# Patient Record
Sex: FEMALE | URBAN - METROPOLITAN AREA
[De-identification: names, ages, dates, MRNs, and addresses within clinical notes are randomized per-mention and may not be internally consistent; named-entity substitution may affect disease eponyms.]

---

## 2019-03-23 ENCOUNTER — RECORDS - HEALTHEAST (OUTPATIENT)
Dept: LAB | Facility: CLINIC | Age: 29
End: 2019-03-23

## 2019-03-24 LAB — BACTERIA SPEC CULT: NO GROWTH

## 2019-03-28 ENCOUNTER — RECORDS - HEALTHEAST (OUTPATIENT)
Dept: LAB | Facility: CLINIC | Age: 29
End: 2019-03-28

## 2019-03-29 LAB
C TRACH DNA SPEC QL PROBE+SIG AMP: NEGATIVE
N GONORRHOEA DNA SPEC QL NAA+PROBE: NEGATIVE

## 2019-04-02 LAB
BKR LAB AP ABNORMAL BLEEDING: NO
BKR LAB AP BIRTH CONTROL/HORMONES: ABNORMAL
BKR LAB AP CERVICAL APPEARANCE: ABNORMAL
BKR LAB AP GYN ADEQUACY: ABNORMAL
BKR LAB AP GYN INTERPRETATION: ABNORMAL
BKR LAB AP HPV REFLEX: ABNORMAL
BKR LAB AP LMP: ABNORMAL
BKR LAB AP PATIENT STATUS: NO
BKR LAB AP PREVIOUS ABNORMAL: NO
BKR LAB AP PREVIOUS NORMAL: ABNORMAL
HIGH RISK?: NO
PATH REPORT.COMMENTS IMP SPEC: ABNORMAL
RESULT FLAG (HE HISTORICAL CONVERSION): ABNORMAL

## 2019-04-03 LAB
HPV SOURCE: ABNORMAL
HUMAN PAPILLOMA VIRUS 16 DNA: NEGATIVE
HUMAN PAPILLOMA VIRUS 18 DNA: NEGATIVE
HUMAN PAPILLOMA VIRUS FINAL DIAGNOSIS: ABNORMAL
HUMAN PAPILLOMA VIRUS OTHER HR: POSITIVE
SPECIMEN DESCRIPTION: ABNORMAL

## 2019-04-25 ENCOUNTER — RECORDS - HEALTHEAST (OUTPATIENT)
Dept: ADMINISTRATIVE | Facility: OTHER | Age: 29
End: 2019-04-25

## 2019-04-25 LAB
LAB AP CHARGES (HE HISTORICAL CONVERSION): NORMAL
PATH REPORT.COMMENTS IMP SPEC: NORMAL
PATH REPORT.COMMENTS IMP SPEC: NORMAL
PATH REPORT.FINAL DX SPEC: NORMAL
PATH REPORT.GROSS SPEC: NORMAL
PATH REPORT.MICROSCOPIC SPEC OTHER STN: NORMAL
PATH REPORT.RELEVANT HX SPEC: NORMAL
RESULT FLAG (HE HISTORICAL CONVERSION): NORMAL

## 2019-08-02 ASSESSMENT — MIFFLIN-ST. JEOR: SCORE: 1449.36

## 2019-08-07 ENCOUNTER — ANESTHESIA - HEALTHEAST (OUTPATIENT)
Dept: SURGERY | Facility: AMBULATORY SURGERY CENTER | Age: 29
End: 2019-08-07

## 2019-08-08 ENCOUNTER — SURGERY - HEALTHEAST (OUTPATIENT)
Dept: SURGERY | Facility: AMBULATORY SURGERY CENTER | Age: 29
End: 2019-08-08

## 2019-08-08 ASSESSMENT — MIFFLIN-ST. JEOR: SCORE: 1449.36

## 2021-05-27 ENCOUNTER — RECORDS - HEALTHEAST (OUTPATIENT)
Dept: LAB | Facility: CLINIC | Age: 31
End: 2021-05-27

## 2021-05-27 LAB
ANION GAP SERPL CALCULATED.3IONS-SCNC: 11 MMOL/L (ref 5–18)
BUN SERPL-MCNC: 9 MG/DL (ref 8–22)
CALCIUM SERPL-MCNC: 9.2 MG/DL (ref 8.5–10.5)
CHLORIDE BLD-SCNC: 103 MMOL/L (ref 98–107)
CHOLEST SERPL-MCNC: 185 MG/DL
CO2 SERPL-SCNC: 25 MMOL/L (ref 22–31)
CREAT SERPL-MCNC: 0.73 MG/DL (ref 0.6–1.1)
FASTING STATUS PATIENT QL REPORTED: ABNORMAL
GFR SERPL CREATININE-BSD FRML MDRD: >60 ML/MIN/1.73M2
GLUCOSE BLD-MCNC: 92 MG/DL (ref 70–125)
HDLC SERPL-MCNC: 36 MG/DL
LDLC SERPL CALC-MCNC: 81 MG/DL
POTASSIUM BLD-SCNC: 4.1 MMOL/L (ref 3.5–5)
SODIUM SERPL-SCNC: 139 MMOL/L (ref 136–145)
TRIGL SERPL-MCNC: 340 MG/DL

## 2021-05-31 NOTE — ANESTHESIA CARE TRANSFER NOTE
Last vitals:   Vitals:    08/08/19 1103   BP: 127/73   Pulse: (!) 103   Resp: 16   Temp: 36.2  C (97.1  F)   SpO2: 100%     Pt brought to PACU on 10L facemask. Monitors applied. VSS upon arrival.    Patient's level of consciousness is drowsy  Spontaneous respirations: yes  Maintains airway independently: yes  Dentition unchanged: yes  Oropharynx: oropharynx clear of all foreign objects    QCDR Measures:  ASA# 20 - Surgical Safety Checklist: WHO surgical safety checklist completed prior to induction    PQRS# 430 - Adult PONV Prevention: 4558F - Pt received => 2 anti-emetic agents (different classes) preop & intraop  ASA# 8 - Peds PONV Prevention: NA - Not pediatric patient, not GA or 2 or more risk factors NOT present  PQRS# 424 - Cesia-op Temp Management: 4559F - At least one body temp DOCUMENTED => 35.5C or 95.9F within required timeframe  PQRS# 426 - PACU Transfer Protocol: - Transfer of care checklist used  ASA# 14 - Acute Post-op Pain: ASA14B - Patient did NOT experience pain >= 7 out of 10

## 2021-05-31 NOTE — ANESTHESIA POSTPROCEDURE EVALUATION
Patient: Omi AGUIRRE Abby  LAPAROSCOPY BILATERAL SALPINGECTOMY, LOOP ELECTROSURGICAL EXCISION PROCEDURE, LOOP ELECTROSURGICAL EXCISION PROCEDURE  Anesthesia type: general    Patient location: PACU  Last vitals:   Vitals Value Taken Time   /71 8/8/2019 11:32 AM   Temp 36.2  C (97.1  F) 8/8/2019 11:03 AM   Pulse 72 8/8/2019 11:34 AM   Resp 16 8/8/2019 11:32 AM   SpO2 96 % 8/8/2019 11:34 AM   Vitals shown include unvalidated device data.  Post vital signs: stable  Level of consciousness: alert, conversant  Post-anesthesia pain: pain controlled  Post-anesthesia nausea and vomiting: no  Pulmonary: room air  Cardiovascular: stable and blood pressure at baseline  Hydration: adequate  Anesthetic events: no    QCDR Measures:  ASA# 11 - Cesia-op Cardiac Arrest: ASA11B - Patient did NOT experience unanticipated cardiac arrest  ASA# 12 - Cesia-op Mortality Rate: ASA12B - Patient did NOT die  ASA# 13 - PACU Re-Intubation Rate: ASA13B - Patient did NOT require a new airway mgmt  ASA# 10 - Composite Anes Safety: ASA10A - No serious adverse event    Additional Notes:

## 2021-05-31 NOTE — ANESTHESIA PREPROCEDURE EVALUATION
Anesthesia Evaluation      Patient summary reviewed   No history of anesthetic complications     Airway   Mallampati: I  Neck ROM: full   Pulmonary - negative ROS and normal exam                          Cardiovascular - negative ROS and normal exam  Exercise tolerance: > or = 4 METS   Neuro/Psych - negative ROS     Endo/Other    (+) obesity,      GI/Hepatic/Renal - negative ROS      Other findings: Desires sterilization.  BMI 30.  Hg 13.2. Nose piercings; ear piercing that does not come out.      Dental - normal exam                        Anesthesia Plan  Planned anesthetic: general endotracheal  Transderm scop patch, ketamine 40 mg after induction.  ASA 1   Induction: intravenous   Anesthetic plan and risks discussed with: patient  Anesthesia plan special considerations: antiemetics,   Post-op plan: routine recovery

## 2021-06-02 ENCOUNTER — RECORDS - HEALTHEAST (OUTPATIENT)
Dept: LAB | Facility: CLINIC | Age: 31
End: 2021-06-02

## 2021-06-02 LAB — INR PPP: 0.97 (ref 0.9–1.1)

## 2021-06-03 VITALS — BODY MASS INDEX: 31.28 KG/M2 | WEIGHT: 170 LBS | HEIGHT: 62 IN

## 2021-06-03 LAB
ALBUMIN SERPL-MCNC: 4.1 G/DL (ref 3.5–5)
ALP SERPL-CCNC: 85 U/L (ref 45–120)
ALT SERPL W P-5'-P-CCNC: 20 U/L (ref 0–45)
ANION GAP SERPL CALCULATED.3IONS-SCNC: 11 MMOL/L (ref 5–18)
AST SERPL W P-5'-P-CCNC: 14 U/L (ref 0–40)
BILIRUB SERPL-MCNC: 0.2 MG/DL (ref 0–1)
BUN SERPL-MCNC: 10 MG/DL (ref 8–22)
CALCIUM SERPL-MCNC: 9.3 MG/DL (ref 8.5–10.5)
CHLORIDE BLD-SCNC: 104 MMOL/L (ref 98–107)
CO2 SERPL-SCNC: 24 MMOL/L (ref 22–31)
CREAT SERPL-MCNC: 0.75 MG/DL (ref 0.6–1.1)
GFR SERPL CREATININE-BSD FRML MDRD: >60 ML/MIN/1.73M2
GLUCOSE BLD-MCNC: 105 MG/DL (ref 70–125)
HIV 1+2 AB+HIV1 P24 AG SERPL QL IA: NEGATIVE
POTASSIUM BLD-SCNC: 3.5 MMOL/L (ref 3.5–5)
PROT SERPL-MCNC: 7 G/DL (ref 6–8)
SODIUM SERPL-SCNC: 139 MMOL/L (ref 136–145)

## 2021-06-07 LAB
BKR LAB AP ABNORMAL BLEEDING: NO
BKR LAB AP BIRTH CONTROL/HORMONES: ABNORMAL
BKR LAB AP CERVICAL APPEARANCE: ABNORMAL
BKR LAB AP GYN ADEQUACY: ABNORMAL
BKR LAB AP GYN INTERPRETATION: ABNORMAL
BKR LAB AP HPV REFLEX: ABNORMAL
BKR LAB AP LMP: ABNORMAL
BKR LAB AP PATIENT STATUS: NO
BKR LAB AP PREVIOUS ABNORMAL: 2019
BKR LAB AP PREVIOUS NORMAL: ABNORMAL
HIGH RISK?: NO
PATH REPORT.COMMENTS IMP SPEC: ABNORMAL
RESULT FLAG (HE HISTORICAL CONVERSION): ABNORMAL

## 2021-06-10 ENCOUNTER — RECORDS - HEALTHEAST (OUTPATIENT)
Dept: LAB | Facility: CLINIC | Age: 31
End: 2021-06-10

## 2021-06-10 LAB — APTT PPP: 30 SECONDS (ref 24–37)

## 2021-06-15 ENCOUNTER — RECORDS - HEALTHEAST (OUTPATIENT)
Dept: LAB | Facility: CLINIC | Age: 31
End: 2021-06-15

## 2021-06-15 LAB
SARS-COV-2 PCR COMMENT: NORMAL
SARS-COV-2 RNA SPEC QL NAA+PROBE: NEGATIVE
SARS-COV-2 VIRUS SPECIMEN SOURCE: NORMAL

## 2021-12-06 ENCOUNTER — APPOINTMENT (OUTPATIENT)
Dept: URBAN - METROPOLITAN AREA CLINIC 260 | Age: 31
Setting detail: DERMATOLOGY
End: 2021-12-07

## 2021-12-06 VITALS — WEIGHT: 180 LBS | HEIGHT: 63 IN

## 2021-12-06 DIAGNOSIS — K13.0 DISEASES OF LIPS: ICD-10-CM

## 2021-12-06 PROCEDURE — OTHER MIPS QUALITY: OTHER

## 2021-12-06 PROCEDURE — OTHER PRESCRIPTION MEDICATION MANAGEMENT: OTHER

## 2021-12-06 PROCEDURE — OTHER PRESCRIPTION: OTHER

## 2021-12-06 PROCEDURE — OTHER COUNSELING: OTHER

## 2021-12-06 PROCEDURE — 99203 OFFICE O/P NEW LOW 30 MIN: CPT

## 2021-12-06 RX ORDER — MUPIROCIN 20 MG/G
OINTMENT TOPICAL
Qty: 22 | Refills: 1 | Status: ERX | COMMUNITY
Start: 2021-12-06

## 2021-12-06 RX ORDER — DESONIDE 0.5 MG/G
CREAM TOPICAL TWICE DAILY
Qty: 15 | Refills: 1 | Status: ERX | COMMUNITY
Start: 2021-12-06

## 2021-12-06 ASSESSMENT — LOCATION SIMPLE DESCRIPTION DERM
LOCATION SIMPLE: LEFT ORAL COMMISSURE
LOCATION SIMPLE: LEFT LIP
LOCATION SIMPLE: RIGHT ORAL COMMISSURE

## 2021-12-06 ASSESSMENT — LOCATION DETAILED DESCRIPTION DERM
LOCATION DETAILED: RIGHT ORAL COMMISSURE
LOCATION DETAILED: LEFT LOWER CUTANEOUS LIP
LOCATION DETAILED: LEFT ORAL COMMISSURE

## 2021-12-06 ASSESSMENT — LOCATION ZONE DERM: LOCATION ZONE: LIP

## 2021-12-06 NOTE — PROCEDURE: PRESCRIPTION MEDICATION MANAGEMENT
Initiate Treatment: Mupirocin 2% ointment Three times daily, Desonide 0.05% cream twice daily
Detail Level: Simple
Render In Strict Bullet Format?: No

## 2021-12-14 ENCOUNTER — APPOINTMENT (OUTPATIENT)
Dept: URBAN - METROPOLITAN AREA CLINIC 260 | Age: 31
Setting detail: DERMATOLOGY
End: 2021-12-16

## 2021-12-14 VITALS — WEIGHT: 180 LBS | HEIGHT: 63 IN

## 2021-12-14 DIAGNOSIS — K13.0 DISEASES OF LIPS: ICD-10-CM

## 2021-12-14 PROCEDURE — OTHER MIPS QUALITY: OTHER

## 2021-12-14 PROCEDURE — OTHER COUNSELING: OTHER

## 2021-12-14 PROCEDURE — 99213 OFFICE O/P EST LOW 20 MIN: CPT

## 2021-12-14 PROCEDURE — OTHER PRESCRIPTION MEDICATION MANAGEMENT: OTHER

## 2021-12-14 PROCEDURE — OTHER PRESCRIPTION: OTHER

## 2021-12-14 RX ORDER — FLUCONAZOLE 150 MG/1
TABLET ORAL
Qty: 1 | Refills: 1 | Status: ERX | COMMUNITY
Start: 2021-12-14

## 2021-12-14 ASSESSMENT — LOCATION DETAILED DESCRIPTION DERM
LOCATION DETAILED: LEFT LOWER CUTANEOUS LIP
LOCATION DETAILED: RIGHT ORAL COMMISSURE
LOCATION DETAILED: LEFT ORAL COMMISSURE

## 2021-12-14 ASSESSMENT — LOCATION SIMPLE DESCRIPTION DERM
LOCATION SIMPLE: LEFT LIP
LOCATION SIMPLE: LEFT ORAL COMMISSURE
LOCATION SIMPLE: RIGHT ORAL COMMISSURE

## 2021-12-14 ASSESSMENT — LOCATION ZONE DERM: LOCATION ZONE: LIP

## 2021-12-14 NOTE — PROCEDURE: PRESCRIPTION MEDICATION MANAGEMENT
Detail Level: Simple
Continue Regimen: Mupirocin and Desonide
Initiate Treatment: Diflucan
Render In Strict Bullet Format?: No
Modify Regimen: Include Aquaphor in regimen

## 2021-12-14 NOTE — PROCEDURE: MIPS QUALITY
Quality 110: Preventive Care And Screening: Influenza Immunization: Influenza Immunization Ordered or Recommended, but not Administered due to system reason
Quality 226: Preventive Care And Screening: Tobacco Use: Screening And Cessation Intervention: Patient screened for tobacco use and is an ex/non-smoker
Detail Level: Detailed
Quality 130: Documentation Of Current Medications In The Medical Record: Current Medications Documented
Quality 431: Preventive Care And Screening: Unhealthy Alcohol Use - Screening: Patient not identified as an unhealthy alcohol user when screened for unhealthy alcohol use using a systematic screening method

## 2021-12-21 ENCOUNTER — LAB REQUISITION (OUTPATIENT)
Dept: LAB | Facility: CLINIC | Age: 31
End: 2021-12-21

## 2021-12-21 DIAGNOSIS — R19.7 DIARRHEA, UNSPECIFIED: ICD-10-CM

## 2021-12-21 PROCEDURE — 80053 COMPREHEN METABOLIC PANEL: CPT | Performed by: PHYSICIAN ASSISTANT

## 2021-12-21 PROCEDURE — 86141 C-REACTIVE PROTEIN HS: CPT | Performed by: PHYSICIAN ASSISTANT

## 2021-12-21 PROCEDURE — 83516 IMMUNOASSAY NONANTIBODY: CPT | Performed by: PHYSICIAN ASSISTANT

## 2021-12-22 ENCOUNTER — LAB REQUISITION (OUTPATIENT)
Dept: LAB | Facility: CLINIC | Age: 31
End: 2021-12-22

## 2021-12-22 DIAGNOSIS — R19.7 DIARRHEA, UNSPECIFIED: ICD-10-CM

## 2021-12-22 LAB
ALBUMIN SERPL-MCNC: 4 G/DL (ref 3.5–5)
ALP SERPL-CCNC: 82 U/L (ref 45–120)
ALT SERPL W P-5'-P-CCNC: 19 U/L (ref 0–45)
ANION GAP SERPL CALCULATED.3IONS-SCNC: 13 MMOL/L (ref 5–18)
AST SERPL W P-5'-P-CCNC: 14 U/L (ref 0–40)
BILIRUB SERPL-MCNC: 0.2 MG/DL (ref 0–1)
BUN SERPL-MCNC: 6 MG/DL (ref 8–22)
C REACTIVE PROTEIN LHE: 0.4 MG/DL (ref 0–0.8)
CALCIUM SERPL-MCNC: 9.1 MG/DL (ref 8.5–10.5)
CHLORIDE BLD-SCNC: 106 MMOL/L (ref 98–107)
CO2 SERPL-SCNC: 20 MMOL/L (ref 22–31)
CREAT SERPL-MCNC: 0.73 MG/DL (ref 0.6–1.1)
GFR SERPL CREATININE-BSD FRML MDRD: >90 ML/MIN/1.73M2
GLUCOSE BLD-MCNC: 91 MG/DL (ref 70–125)
POTASSIUM BLD-SCNC: 4.1 MMOL/L (ref 3.5–5)
PROT SERPL-MCNC: 7.1 G/DL (ref 6–8)
SODIUM SERPL-SCNC: 139 MMOL/L (ref 136–145)

## 2021-12-22 PROCEDURE — 87338 HPYLORI STOOL AG IA: CPT | Performed by: PHYSICIAN ASSISTANT

## 2021-12-23 LAB
TTG IGA SER-ACNC: 0.7 U/ML
TTG IGG SER-ACNC: <0.6 U/ML

## 2021-12-24 LAB — H PYLORI AG STL QL IA: NEGATIVE

## 2021-12-27 ENCOUNTER — APPOINTMENT (OUTPATIENT)
Dept: URBAN - METROPOLITAN AREA CLINIC 260 | Age: 31
Setting detail: DERMATOLOGY
End: 2021-12-27

## 2022-01-03 ENCOUNTER — APPOINTMENT (OUTPATIENT)
Age: 32
Setting detail: DERMATOLOGY
End: 2022-01-04

## 2022-01-03 VITALS — WEIGHT: 190 LBS | RESPIRATION RATE: 16 BRPM | HEIGHT: 63 IN

## 2022-01-03 DIAGNOSIS — K13.0 DISEASES OF LIPS: ICD-10-CM

## 2022-01-03 PROCEDURE — 99213 OFFICE O/P EST LOW 20 MIN: CPT

## 2022-01-03 PROCEDURE — OTHER COUNSELING: OTHER

## 2022-01-03 PROCEDURE — OTHER PRESCRIPTION MEDICATION MANAGEMENT: OTHER

## 2022-01-03 PROCEDURE — OTHER REASON FOR TELEMEDICINE VISIT: OTHER

## 2022-01-03 ASSESSMENT — LOCATION DETAILED DESCRIPTION DERM
LOCATION DETAILED: RIGHT ORAL COMMISSURE
LOCATION DETAILED: LEFT ORAL COMMISSURE
LOCATION DETAILED: LEFT LOWER CUTANEOUS LIP

## 2022-01-03 ASSESSMENT — LOCATION SIMPLE DESCRIPTION DERM
LOCATION SIMPLE: LEFT LIP
LOCATION SIMPLE: RIGHT ORAL COMMISSURE
LOCATION SIMPLE: LEFT ORAL COMMISSURE

## 2022-01-03 ASSESSMENT — LOCATION ZONE DERM: LOCATION ZONE: LIP

## 2022-01-03 NOTE — PROCEDURE: PRESCRIPTION MEDICATION MANAGEMENT
Continue Regimen: Aquaphor
Render In Strict Bullet Format?: No
Detail Level: Simple
Discontinue Regimen: Mupirocin and Desonide
Plan: Follow up as needed